# Patient Record
Sex: MALE | Race: WHITE
[De-identification: names, ages, dates, MRNs, and addresses within clinical notes are randomized per-mention and may not be internally consistent; named-entity substitution may affect disease eponyms.]

---

## 2017-12-07 NOTE — NUR
PT UP TO BR W/[employer] OFFICER ASSIST AND REPORTS A SUCCESSFUL VOID. PT REQ DC
HOME. VERBAL DC INSTRUCTIONS GIVEN AND PT VERBALIZES UNDERSTANDING. PT
TRANSFERS SELF WELL AND IS ASSISTED DRESSED BY [employer] OFFICERS.

## 2017-12-08 NOTE — OR
[facility]
                                    [address], [place]  [address]
_________________________________________________________________________________________
                                                                 Signed   
 
 
DATE OF OPERATION:
12/07/2017
 
SURGEON:
Silvano Bustillos MD
 
PREOPERATIVE DIAGNOSIS:
Lateral meniscal tear, right knee.
 
POSTOPERATIVE DIAGNOSIS:
Multiple (about 15) loose bodies in the right knee.
 
PROCEDURE:
Knee arthroscopy with removal of multiple loose bodies.
 
ANESTHESIA:
General.
 
SPECIMENS OR COMPLICATIONS:
There were no specimens or complications.
 
TOURNIQUET TIME:
About 20 minutes.
 
WHAT WAS DONE:
The patient was taken to the operating room.  After anesthesia was induced and the airway
secured, the patient was positioned prepped and draped in a routine sterile fashion.  The
outflow cannula was inserted superomedially and the arthroscope inserted anterolaterally.
 An anteromedial portal was created using transillumination and localization with a 
spinal needle.  We then introduced the probe.  The suprapatellar pouch revealed probably 
about half a dozen of small cartilaginous loose bodies.  The synovial tissue itself 
looked fine.  Nothing to suggest that there was synovial chondromatosis, but clearly 
there were multiple tiny loose bodies.  The patella and the femoral trochlea were 
completely unremarkable.  The medial recess again revealed all multitude of tiny little 
cartilaginous loose bodies.  None of which were attached to the synovium.  We were able 
to put the motorized shaver through the outflow cannula superomedially and removed all of
these loose bodies.  The medial compartment revealed an intact and unscarred medial 
femoral condyle and medial tibial plateau.  Careful probing of the medial meniscus did 
not reveal any meniscal pathology.  Intercondylar notch was similarly completely 
unremarkable with an intact ACL and PCL.  The lateral compartment revealed an 
unremarkable lateral femoral condyle and unremarkable tibial plateau, except for a large 
sagittal plane fissure through the articular cartilage on the tibial plateau just 
 
    Electronically Signed By: SILVANO BUSTILLOS MD  12/08/17 1339
_________________________________________________________________________________________
PATIENT NAME:     CHARLETTE MCGOVERN                 
MEDICAL RECORD #: [id number]                     OPERATIVE REPORT              
          ACCT #: [id number]  
DATE OF BIRTH:   [birth year]                                       
PHYSICIAN:   SILVANO BUSTILLOS MD           REPORT #: [id number]
REPORT IS CONFIDENTIAL AND NOT TO BE RELEASED WITHOUT AUTHORIZATION
 
 
                                  [facility]
                                    [address], [place]  [address]
_________________________________________________________________________________________
                                                                 Signed   
 
 
slightly lateral to the mid portion of the lateral tibial plateau.  The lateral meniscus 
was completely unremarkable throughout to inspection and palpation.  We then maneuvered 
the scope into the lateral recess where there again about 8-10 tiny loose bodies.  We 
made an auxiliary superolateral portal introduced the motorized shaver and then removed 
all these loose bodies.  We then reinspected the suprapatellar pouch, the medial 
compartment, and the medial recess and the lateral recess and found another 4-5 loose 
bodies that we removed.  We then copiously flushed the knee out.  Portals were closed.  
 
Sterile dressings applied.  The patient was awakened and taken to recovery room where he 
arrived in stable condition.  Counts were correct and antibiotic protocols were 
followed.
 
 
 
 
________________________________________
 
 
MD HANNY Alvarez/MODL
Job #:  [id number]
DD:  12/07/2017 10:20:56
DT:  12/07/2017 15:30:38
 
 
 
 
 
 
 
 
 
 
 
 
 
 
 
 
 
    Electronically Signed By: SILVANO BUSTILLOS MD  12/08/17 1339
_________________________________________________________________________________________
PATIENT NAME:     CHARLETTE MCGOVERN                 
MEDICAL RECORD #: [id number]                     OPERATIVE REPORT              
          ACCT #: [id number]  
DATE OF BIRTH:   [birth year]                                       
PHYSICIAN:   SILVANO BUSTILLOS MD           REPORT #: [id number]
REPORT IS CONFIDENTIAL AND NOT TO BE RELEASED WITHOUT AUTHORIZATION

## 2020-04-17 ENCOUNTER — HOSPITAL ENCOUNTER (EMERGENCY)
Dept: HOSPITAL 46 - ED | Age: 35
Discharge: HOME | End: 2020-04-17
Payer: COMMERCIAL

## 2020-04-17 VITALS — HEIGHT: 67 IN | BODY MASS INDEX: 25.9 KG/M2 | WEIGHT: 164.99 LBS

## 2020-04-17 DIAGNOSIS — S63.284A: ICD-10-CM
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
## 2020-04-17 DIAGNOSIS — S62.624A: Primary | ICD-10-CM

## 2020-04-17 DIAGNOSIS — Z87.891: ICD-10-CM
